# Patient Record
Sex: FEMALE | Race: WHITE | NOT HISPANIC OR LATINO | URBAN - METROPOLITAN AREA
[De-identification: names, ages, dates, MRNs, and addresses within clinical notes are randomized per-mention and may not be internally consistent; named-entity substitution may affect disease eponyms.]

---

## 2017-03-09 ENCOUNTER — OFFICE (OUTPATIENT)
Dept: URBAN - METROPOLITAN AREA CLINIC 11 | Facility: CLINIC | Age: 52
End: 2017-03-09

## 2017-03-09 VITALS
RESPIRATION RATE: 16 BRPM | SYSTOLIC BLOOD PRESSURE: 143 MMHG | DIASTOLIC BLOOD PRESSURE: 87 MMHG | WEIGHT: 176 LBS | HEART RATE: 99 BPM | HEIGHT: 65 IN

## 2017-03-09 DIAGNOSIS — K58.0 IRRITABLE BOWEL SYNDROME WITH DIARRHEA: ICD-10-CM

## 2017-03-09 DIAGNOSIS — K21.9 GASTRO-ESOPHAGEAL REFLUX DISEASE WITHOUT ESOPHAGITIS: ICD-10-CM

## 2017-03-09 PROCEDURE — 99213 OFFICE O/P EST LOW 20 MIN: CPT | Performed by: INTERNAL MEDICINE

## 2017-03-09 NOTE — SERVICENOTES
We discussed her IBS issues and the samples of the Xifaxan (She was not able to complete the other round as planned). We discussed her FODMAP trial and that it was not a good fit for her IBS and lifestyle.  We discussed the longterm use fo the PPIs fo rher GERD.

## 2017-03-09 NOTE — SERVICEHPINOTES
She presents to f/u her IBS.  She had tried the FODMAP diet but found it too limiting.  She has noted taht she has issus iwth carbonated drinks, cheeses, oranages, and stressors causing issues iwth her IBS. She has improved in the past with Xifaxan but was not able to get the recent dosing covered.  She has been on her meds for her reflux and has been doing well.  If she misses the meds, she has a return of the burning.  She has not had issues while on meds.  Her asthma has been stable.  She had labs done and was vitamin D def.  She is on replacement.  Her LFTs, CBC and chemistries were normal.

## 2017-12-07 ENCOUNTER — OFFICE (OUTPATIENT)
Dept: URBAN - METROPOLITAN AREA CLINIC 11 | Facility: CLINIC | Age: 52
End: 2017-12-07

## 2017-12-07 VITALS
HEIGHT: 65 IN | WEIGHT: 178 LBS | HEART RATE: 88 BPM | RESPIRATION RATE: 16 BRPM | SYSTOLIC BLOOD PRESSURE: 160 MMHG | DIASTOLIC BLOOD PRESSURE: 100 MMHG

## 2017-12-07 DIAGNOSIS — K21.9 GASTRO-ESOPHAGEAL REFLUX DISEASE WITHOUT ESOPHAGITIS: ICD-10-CM

## 2017-12-07 PROCEDURE — 99213 OFFICE O/P EST LOW 20 MIN: CPT | Performed by: INTERNAL MEDICINE

## 2017-12-07 NOTE — SERVICEHPINOTES
She has been doing quite well.  She has completed her Xifaxan and changed her diet to smaller portions.  With this her IBS has stopped.  She has been donig well with her reflux "as long as I'm medicated".  She has been consistent with her meds.  She has not had dysphagia or such.  She is currently being treated for a sinus infection with Cefdinir. She has been treated for her vitamin D def.

## 2017-12-07 NOTE — SERVICENOTES
Her IBS has seemingly resolved after the Xifaxan and with her diet changes.  Her GERD has been well controlled on the Protonix.  We discussed her diet and the longterm use of the meds inclduing current data.

## 2018-12-06 ENCOUNTER — OFFICE (OUTPATIENT)
Dept: URBAN - METROPOLITAN AREA CLINIC 11 | Facility: CLINIC | Age: 53
End: 2018-12-06
Payer: COMMERCIAL

## 2018-12-06 VITALS
HEART RATE: 90 BPM | SYSTOLIC BLOOD PRESSURE: 125 MMHG | DIASTOLIC BLOOD PRESSURE: 77 MMHG | WEIGHT: 186 LBS | HEIGHT: 65 IN

## 2018-12-06 DIAGNOSIS — K21.9 GASTRO-ESOPHAGEAL REFLUX DISEASE WITHOUT ESOPHAGITIS: ICD-10-CM

## 2018-12-06 PROCEDURE — 99213 OFFICE O/P EST LOW 20 MIN: CPT | Performed by: INTERNAL MEDICINE

## 2018-12-06 RX ORDER — PANTOPRAZOLE SODIUM 40 MG/1
40 TABLET, DELAYED RELEASE ORAL
Qty: 90 | Refills: 3 | Status: ACTIVE

## 2018-12-06 NOTE — SERVICEHPINOTES
She has been doing well with her GERD. If she misses her protonix she has a prompt return of her reflux with heartburn.  She is not able to eat without the PPI due to the severity of her reflux.  She has not had dypshagia or other new issues.She has been on gabapentin for her spinal issue and is underoing nerve blocks.

## 2018-12-06 NOTE — SERVICENOTES
She has been doing well and will still need her PPIs even with diet control.  We discussed the longterm use of the meds and potential risks.

## 2019-12-12 ENCOUNTER — OFFICE (OUTPATIENT)
Dept: URBAN - METROPOLITAN AREA CLINIC 11 | Facility: CLINIC | Age: 54
End: 2019-12-12

## 2019-12-12 VITALS
SYSTOLIC BLOOD PRESSURE: 122 MMHG | WEIGHT: 179 LBS | HEART RATE: 101 BPM | DIASTOLIC BLOOD PRESSURE: 77 MMHG | HEIGHT: 65 IN

## 2019-12-12 DIAGNOSIS — K21.9 GASTRO-ESOPHAGEAL REFLUX DISEASE WITHOUT ESOPHAGITIS: ICD-10-CM

## 2019-12-12 PROCEDURE — 99213 OFFICE O/P EST LOW 20 MIN: CPT | Performed by: INTERNAL MEDICINE

## 2019-12-12 NOTE — SERVICEHPINOTES
She presents for f/u of her GERD and hx of chronic cough.  She stated that if she misses them that the cough and heartburn promptly return.  She has not had issues with dysphagia.  She has not had issues with protonix.   nelly noted some breakthrough if she has extra wine or overeats.  If so, she will take OTC Prevacid which works. This is not frequent.  She has not had issues with abdominal pain.  She has noted issues with lactose. She gets bloating after cheese. She does not drink milk or eat ice cream. She has been using almond milk and coconut milk.

## 2020-12-15 ENCOUNTER — OFFICE (OUTPATIENT)
Dept: URBAN - METROPOLITAN AREA CLINIC 11 | Facility: CLINIC | Age: 55
End: 2020-12-15

## 2020-12-15 VITALS
HEIGHT: 65 IN | DIASTOLIC BLOOD PRESSURE: 71 MMHG | HEART RATE: 82 BPM | SYSTOLIC BLOOD PRESSURE: 122 MMHG | WEIGHT: 175 LBS | OXYGEN SATURATION: 98 %

## 2020-12-15 DIAGNOSIS — K21.9 GASTRO-ESOPHAGEAL REFLUX DISEASE WITHOUT ESOPHAGITIS: ICD-10-CM

## 2020-12-15 PROCEDURE — 99213 OFFICE O/P EST LOW 20 MIN: CPT | Performed by: INTERNAL MEDICINE

## 2020-12-15 RX ORDER — PANTOPRAZOLE SODIUM 40 MG/1
40 TABLET, DELAYED RELEASE ORAL
Qty: 90 | Refills: 3 | Status: ACTIVE

## 2020-12-15 NOTE — SERVICENOTES
She has been doing well wtih her GERD and has not had issues with her meds.  Given the hx of GERD and asthma with chronic cough, I would contiue the Protonix.

## 2020-12-15 NOTE — SERVICEHPINOTES
She presents for f/u of her GERD.  She stated that she has been doing well.  She has been well controlled on her meds but has had a few episodes of breakthrough after eating "rich foods".  She stated that with her meds and diet control that she does well.  She has not had issues with dysphagia.  She stated that her cough was doing well and seems to be more sinus and seasonal.She has been on Protonix and has not had difficulties with her meds. She has had a hx of astham and has been doing well but has had a few issues this year.
